# Patient Record
Sex: FEMALE | Race: BLACK OR AFRICAN AMERICAN | NOT HISPANIC OR LATINO | URBAN - METROPOLITAN AREA
[De-identification: names, ages, dates, MRNs, and addresses within clinical notes are randomized per-mention and may not be internally consistent; named-entity substitution may affect disease eponyms.]

---

## 2023-08-14 ENCOUNTER — OFFICE VISIT (OUTPATIENT)
Dept: URBAN - METROPOLITAN AREA CLINIC 70 | Facility: CLINIC | Age: 48
End: 2023-08-14

## 2023-08-15 ENCOUNTER — CLAIMS CREATED FROM THE CLAIM WINDOW (OUTPATIENT)
Dept: URBAN - METROPOLITAN AREA CLINIC 70 | Facility: CLINIC | Age: 48
End: 2023-08-15
Payer: COMMERCIAL

## 2023-08-15 ENCOUNTER — LAB OUTSIDE AN ENCOUNTER (OUTPATIENT)
Dept: URBAN - METROPOLITAN AREA CLINIC 70 | Facility: CLINIC | Age: 48
End: 2023-08-15

## 2023-08-15 VITALS
HEART RATE: 101 BPM | BODY MASS INDEX: 31.28 KG/M2 | WEIGHT: 206.4 LBS | HEIGHT: 68 IN | SYSTOLIC BLOOD PRESSURE: 118 MMHG | DIASTOLIC BLOOD PRESSURE: 79 MMHG | TEMPERATURE: 97.8 F

## 2023-08-15 DIAGNOSIS — K59.09 CHRONIC CONSTIPATION: ICD-10-CM

## 2023-08-15 DIAGNOSIS — K64.9 ACUTE HEMORRHOID: ICD-10-CM

## 2023-08-15 DIAGNOSIS — Z12.11 COLON CANCER SCREENING: ICD-10-CM

## 2023-08-15 DIAGNOSIS — K62.5 RECTAL BLEEDING: ICD-10-CM

## 2023-08-15 PROCEDURE — 99204 OFFICE O/P NEW MOD 45 MIN: CPT | Performed by: NURSE PRACTITIONER

## 2023-08-15 PROCEDURE — 99204 OFFICE O/P NEW MOD 45 MIN: CPT | Performed by: INTERNAL MEDICINE

## 2023-08-15 RX ORDER — HYDROXYCHLOROQUINE SULFATE 200 MG/1
TABLET ORAL
Qty: 0 | Refills: 0 | Status: ON HOLD | COMMUNITY
Start: 1900-01-01

## 2023-08-15 NOTE — HPI-TODAY'S VISIT:
Patient is a 48 y/o female who presents today for evaluation of rectal bleeding. She reports intermittent BRBPR after BMs x 3 months. Last episode of rectal bleeding was 1 week ago. No hematemesis or melena. No blood thinning medications. VSS. Admits to chronic constipation. Has BM x 1 everyday to every other day with feeling on incomplete evacuation with rectal discomfort prior to defecation. Not taking any medication for constipation. She was seen by our group in 2018 for similar symptoms with undergoing a colonoscopy at that time with findings of internal hemorrhoid thought to be etiology of bleeding. No Fhx of colon cancer. Has some associated abdominal cramping. Denies fever, CP, SOB, dizziness, N/V, wt loss, or diarrhea.

## 2023-08-29 ENCOUNTER — OFFICE VISIT (OUTPATIENT)
Dept: URBAN - METROPOLITAN AREA SURGERY CENTER 24 | Facility: SURGERY CENTER | Age: 48
End: 2023-08-29
Payer: COMMERCIAL

## 2023-08-29 DIAGNOSIS — Z12.11 COLON CANCER SCREENING: ICD-10-CM

## 2023-08-29 PROCEDURE — G0121 COLON CA SCRN NOT HI RSK IND: HCPCS | Performed by: INTERNAL MEDICINE

## 2023-08-29 PROCEDURE — G8907 PT DOC NO EVENTS ON DISCHARG: HCPCS | Performed by: INTERNAL MEDICINE

## 2023-08-29 RX ORDER — HYDROXYCHLOROQUINE SULFATE 200 MG/1
TABLET ORAL
Qty: 0 | Refills: 0 | Status: ON HOLD | COMMUNITY
Start: 1900-01-01

## 2023-08-31 ENCOUNTER — TELEPHONE ENCOUNTER (OUTPATIENT)
Dept: URBAN - METROPOLITAN AREA CLINIC 3 | Facility: CLINIC | Age: 48
End: 2023-08-31

## 2023-09-26 ENCOUNTER — OFFICE VISIT (OUTPATIENT)
Dept: URBAN - METROPOLITAN AREA CLINIC 70 | Facility: CLINIC | Age: 48
End: 2023-09-26

## 2023-10-09 ENCOUNTER — OFFICE VISIT (OUTPATIENT)
Dept: URBAN - METROPOLITAN AREA CLINIC 70 | Facility: CLINIC | Age: 48
End: 2023-10-09
Payer: COMMERCIAL

## 2023-10-09 ENCOUNTER — DASHBOARD ENCOUNTERS (OUTPATIENT)
Age: 48
End: 2023-10-09

## 2023-10-09 VITALS
HEART RATE: 109 BPM | WEIGHT: 205.6 LBS | BODY MASS INDEX: 31.16 KG/M2 | TEMPERATURE: 98.4 F | DIASTOLIC BLOOD PRESSURE: 81 MMHG | SYSTOLIC BLOOD PRESSURE: 127 MMHG | HEIGHT: 68 IN

## 2023-10-09 DIAGNOSIS — Z12.11 COLON CANCER SCREENING: ICD-10-CM

## 2023-10-09 DIAGNOSIS — K59.09 CHRONIC CONSTIPATION: ICD-10-CM

## 2023-10-09 DIAGNOSIS — K64.8 INTERNAL HEMORRHOID: ICD-10-CM

## 2023-10-09 DIAGNOSIS — K62.5 RECTAL BLEEDING: ICD-10-CM

## 2023-10-09 PROCEDURE — 99214 OFFICE O/P EST MOD 30 MIN: CPT | Performed by: NURSE PRACTITIONER

## 2023-10-09 RX ORDER — HYDROXYCHLOROQUINE SULFATE 200 MG/1
TABLET ORAL
Qty: 0 | Refills: 0 | Status: ON HOLD | COMMUNITY
Start: 1900-01-01

## 2023-10-09 NOTE — HPI-TODAY'S VISIT:
OV 8/15/23: Patient is a 46 y/o female who presents today for evaluation of rectal bleeding. She reports intermittent BRBPR after BMs x 3 months. Last episode of rectal bleeding was 1 week ago. No hematemesis or melena. No blood thinning medications. VSS. Admits to chronic constipation. Has BM x 1 everyday to every other day with feeling on incomplete evacuation with rectal discomfort prior to defecation. Not taking any medication for constipation. She was seen by our group in 2018 for similar symptoms with undergoing a colonoscopy at that time with findings of internal hemorrhoid thought to be etiology of bleeding. No Fhx of colon cancer. Has some associated abdominal cramping. Denies fever, CP, SOB, dizziness, N/V, wt loss, or diarrhea. -------------------------- Today 10/9/23: Patient presents today for follow up. Underwent colonoscopy 8/29/23 with normal findings (recall in 10 years). Results discussed with patient with all questions answered. Rectal bleeding has improved. She took miralax for a few days but has now stopped taking medication. No new GI complaints.